# Patient Record
Sex: FEMALE | Race: WHITE | Employment: FULL TIME | URBAN - METROPOLITAN AREA
[De-identification: names, ages, dates, MRNs, and addresses within clinical notes are randomized per-mention and may not be internally consistent; named-entity substitution may affect disease eponyms.]

---

## 2021-12-06 ENCOUNTER — TELEPHONE (OUTPATIENT)
Dept: OTOLARYNGOLOGY | Facility: CLINIC | Age: 21
End: 2021-12-06

## 2021-12-06 NOTE — TELEPHONE ENCOUNTER
..Reason for Call:  Post EMERGENCY ROOM ([place]) appointment    Detailed comments: Please call Fatou at [facility] to assist in scheduling this patient on an emergency basis, within the next couple of days please- seen for right peritonsilar region/edema-acute sinusitis-nodes;     Phone Number Patient can be reached at:  phone number:  [phone]     Best Time: anytime(leaves at five today)    Can we leave a detailed message on this number? YES    Call taken on 12/6/2021 at 4:52 PM by Harriett Mauricio

## 2021-12-07 NOTE — TELEPHONE ENCOUNTER
Patient is scheduled at [place] 1:00 on 12/9/2021.     Ashlee Garcia MA, CMA ......12/7/2021...1:53 PM

## 2021-12-08 NOTE — PROGRESS NOTES
Chief Complaint - throat pain, neck swelling    History of Present Illness - Sita Carrera is a 21 year old female with sudden onset throat and neck swelling, right-sided. She couldn't tolerate her secretions. She went to the ER 3 days ago. Was told she had an infection in her throat. COVID negative. She was given clindamycin. Has been using tylenol. Never had this before. She has had tonsillitis, but not like this, and not often. This morning it worsened some. CT neck 12/6/21 showed right peritonsillar phlegmon and lymphadenopathy. No drainable abscess.     Past Medical History - healthy    Allergies - NKDA    Social History - nonsmoker    Review of Systems - As per HPI and PMHx, otherwise 10+ comprehensive system review is negative.    Physical Exam  /63   Pulse 77   Resp 16   SpO2 100%   General - The patient is in no distress.  Alert and oriented, answers questions and cooperates with examination appropriately.   Voice and Breathing - The patient was breathing comfortably without the use of accessory muscles. There was no wheezing, stridor, or stertor.  The patients voice was clear and strong.  Eyes - Extraocular movements intact. Sclera were not icteric or injected, conjunctiva were pink and moist.  Neurologic - Cranial nerves II-XII are grossly intact. Specifically, the facial nerve is intact, House-Brackmann grade 1 of 6.   Mouth - Examination of the oral cavity showed pink, healthy oral mucosa. No lesions or ulcerations noted.  The tongue was mobile and protrudes midline.  Oropharynx - The walls of the oropharynx were smooth, pink, moist, symmetric, and had no lesions or ulcerations.  The tonsils were 2+, but no erythema today. Palpation was not tender. No palatal asymmetry. The uvula was midline and the palate raised symmetrically.   Ears - The auricles appeared normal. The external auditory canals were nonedematous and nonerythematous. The tympanic membranes are normal in appearance, bony  landmarks are intact.  No retraction, perforation, or masses.  No fluid or purulence was seen in the external canal or the middle ear.   Neck -  Right level 2 lymphadenopathy, no fluctuance. There are a few lymph nodes about 1.5-2cm in level 2. Some tenderness.       A/P - Sita Carrera is a 21 year old female with right peritonsillar phlegmon. This seems much improved. No abscess on CT neck from 12/6/21.  She is better on clindamycin.  She still has lymphadenopathy in the right level 2 neck that seems to bother her the most.  There is a small conglomeration of lymph nodes.  I recommend prednisone and finishing a course of clindamycin.  Return if this fails.    Allen Mata MD  Otolaryngology  [facility]

## 2021-12-09 ENCOUNTER — OFFICE VISIT (OUTPATIENT)
Dept: OTOLARYNGOLOGY | Facility: CLINIC | Age: 21
End: 2021-12-09

## 2021-12-09 VITALS
OXYGEN SATURATION: 100 % | SYSTOLIC BLOOD PRESSURE: 106 MMHG | DIASTOLIC BLOOD PRESSURE: 63 MMHG | RESPIRATION RATE: 16 BRPM | HEART RATE: 77 BPM

## 2021-12-09 DIAGNOSIS — R59.1 LYMPHADENOPATHY: Primary | ICD-10-CM

## 2021-12-09 PROCEDURE — 99203 OFFICE O/P NEW LOW 30 MIN: CPT | Performed by: OTOLARYNGOLOGY

## 2021-12-09 RX ORDER — CLINDAMYCIN HCL 300 MG
300 CAPSULE ORAL 4 TIMES DAILY
COMMUNITY

## 2021-12-09 RX ORDER — ACETAMINOPHEN 500 MG
500-1000 TABLET ORAL EVERY 6 HOURS PRN
COMMUNITY

## 2021-12-09 RX ORDER — PREDNISONE 20 MG/1
TABLET ORAL
Qty: 16 TABLET | Refills: 0 | Status: SHIPPED | OUTPATIENT
Start: 2021-12-09

## 2021-12-09 NOTE — LETTER
12/9/2021         RE: Sita Carrera  [address]  [place] [address]        Dear Colleague,    Thank you for referring your patient, Sita Carrera, to the [facility]. Please see a copy of my visit note below.    Chief Complaint - throat pain, neck swelling    History of Present Illness - Sita Carrera is a 21 year old female with sudden onset throat and neck swelling, right-sided. She couldn't tolerate her secretions. She went to the ER 3 days ago. Was told she had an infection in her throat. COVID negative. She was given clindamycin. Has been using tylenol. Never had this before. She has had tonsillitis, but not like this, and not often. This morning it worsened some. CT neck 12/6/21 showed right peritonsillar phlegmon and lymphadenopathy. No drainable abscess.     Past Medical History - healthy    Allergies - NKDA    Social History - nonsmoker    Review of Systems - As per HPI and PMHx, otherwise 10+ comprehensive system review is negative.    Physical Exam  /63   Pulse 77   Resp 16   SpO2 100%   General - The patient is in no distress.  Alert and oriented, answers questions and cooperates with examination appropriately.   Voice and Breathing - The patient was breathing comfortably without the use of accessory muscles. There was no wheezing, stridor, or stertor.  The patients voice was clear and strong.  Eyes - Extraocular movements intact. Sclera were not icteric or injected, conjunctiva were pink and moist.  Neurologic - Cranial nerves II-XII are grossly intact. Specifically, the facial nerve is intact, House-Brackmann grade 1 of 6.   Mouth - Examination of the oral cavity showed pink, healthy oral mucosa. No lesions or ulcerations noted.  The tongue was mobile and protrudes midline.  Oropharynx - The walls of the oropharynx were smooth, pink, moist, symmetric, and had no lesions or ulcerations.  The tonsils were 2+, but no erythema today. Palpation was not  tender. No palatal asymmetry. The uvula was midline and the palate raised symmetrically.   Ears - The auricles appeared normal. The external auditory canals were nonedematous and nonerythematous. The tympanic membranes are normal in appearance, bony landmarks are intact.  No retraction, perforation, or masses.  No fluid or purulence was seen in the external canal or the middle ear.   Neck -  Right level 2 lymphadenopathy, no fluctuance. There are a few lymph nodes about 1.5-2cm in level 2. Some tenderness.       A/P - Sita Carrera is a 21 year old female with right peritonsillar phlegmon. This seems much improved. No abscess on CT neck from 12/6/21.  She is better on clindamycin.  She still has lymphadenopathy in the right level 2 neck that seems to bother her the most.  There is a small conglomeration of lymph nodes.  I recommend prednisone and finishing a course of clindamycin.  Return if this fails.    Allen Mata MD  Otolaryngology  [facility]              Again, thank you for allowing me to participate in the care of your patient.        Sincerely,        Allen Mata MD

## 2021-12-09 NOTE — PATIENT INSTRUCTIONS
General Scheduling Information  To schedule your CT/MRI scan, please contact [place] [facility] at [phone]   [address] [place] [address]    To schedule your Surgery, please contact our Specialty Schedulers at [phone]    ENT Clinic Locations Clinic Hours Telephone Number     Samantha Larry  [address] [place] [address]   Tuesday:       8:00am -- 4:00pm    Wednesday:  8:00am - 4:00pm   To schedule an appointment with   Dr. Mata,   please contact our   Specialty Scheduling Department at:     [phone]       Samantha Guzmán  [address] [place] [address]   Friday:          8:00am - 4:00pm         Urgent Care Locations Clinic Hours Telephone Numbers     Samantha Montes  [address] [place] [address]     Monday-Friday:     11:00pm - 9:00pm    Saturday-Sunday:  9:00am - 5:00pm   [phone]     Samantha Guzmán  [address] [place] [address]     Monday-Friday:      5:00pm - 9:00pm     Saturday-Sunday:  9:00am - 5:00pm   [phone]